# Patient Record
Sex: MALE | Race: WHITE | NOT HISPANIC OR LATINO | Employment: FULL TIME | URBAN - METROPOLITAN AREA
[De-identification: names, ages, dates, MRNs, and addresses within clinical notes are randomized per-mention and may not be internally consistent; named-entity substitution may affect disease eponyms.]

---

## 2017-01-09 ENCOUNTER — PATIENT OUTREACH (OUTPATIENT)
Dept: ADMINISTRATIVE | Facility: HOSPITAL | Age: 52
End: 2017-01-09

## 2017-01-23 ENCOUNTER — OFFICE VISIT (OUTPATIENT)
Dept: FAMILY MEDICINE | Facility: CLINIC | Age: 52
End: 2017-01-23
Payer: OTHER GOVERNMENT

## 2017-01-23 VITALS
SYSTOLIC BLOOD PRESSURE: 124 MMHG | DIASTOLIC BLOOD PRESSURE: 86 MMHG | BODY MASS INDEX: 31.67 KG/M2 | WEIGHT: 209 LBS | HEIGHT: 68 IN | RESPIRATION RATE: 18 BRPM | HEART RATE: 72 BPM

## 2017-01-23 DIAGNOSIS — F95.9 TIC: ICD-10-CM

## 2017-01-23 DIAGNOSIS — F51.01 PRIMARY INSOMNIA: ICD-10-CM

## 2017-01-23 DIAGNOSIS — F33.1 MODERATE EPISODE OF RECURRENT MAJOR DEPRESSIVE DISORDER: Primary | ICD-10-CM

## 2017-01-23 PROCEDURE — 1159F MED LIST DOCD IN RCRD: CPT | Mod: S$GLB,,, | Performed by: INTERNAL MEDICINE

## 2017-01-23 PROCEDURE — 99214 OFFICE O/P EST MOD 30 MIN: CPT | Mod: S$GLB,,, | Performed by: INTERNAL MEDICINE

## 2017-01-23 PROCEDURE — 99999 PR PBB SHADOW E&M-EST. PATIENT-LVL III: CPT | Mod: PBBFAC,,, | Performed by: INTERNAL MEDICINE

## 2017-01-23 RX ORDER — TEMAZEPAM 15 MG/1
CAPSULE ORAL
Qty: 30 CAPSULE | Refills: 0 | Status: SHIPPED | OUTPATIENT
Start: 2017-01-23 | End: 2017-08-07 | Stop reason: SDUPTHER

## 2017-01-23 NOTE — PROGRESS NOTES
Subjective:       Patient ID: Pietro Whatley is a 51 y.o. male.    Chief Complaint: Depression    HPI Comments: Depression - uncontrolled for past 1 month.  Increased sadness and tiredness.  No suicide/homicide ideation.    Insomnia - takes Ambien every night.  Seroquel/ - made him sick  Ticks - resolved for now.    Review of Systems   Constitutional: Negative for appetite change and fever.   HENT: Negative for nosebleeds and trouble swallowing.    Eyes: Negative for discharge and visual disturbance.   Respiratory: Negative for choking and shortness of breath.    Cardiovascular: Negative for chest pain and palpitations.   Gastrointestinal: Negative for abdominal pain, nausea and vomiting.   Musculoskeletal: Negative for arthralgias and joint swelling.   Skin: Negative for rash and wound.   Neurological: Negative for dizziness and syncope.   Psychiatric/Behavioral: Positive for dysphoric mood and sleep disturbance. Negative for confusion, self-injury and suicidal ideas. The patient is not nervous/anxious.        Objective:      Vitals:    01/23/17 0924   BP: 124/86   Pulse: 72   Resp: 18     Physical Exam   Constitutional: He appears well-nourished.   Eyes: Conjunctivae and EOM are normal.   Neck: Normal range of motion.   Cardiovascular: Normal rate and regular rhythm.    Pulmonary/Chest: Effort normal and breath sounds normal.   Musculoskeletal:   Normal ROM bilateral    Neurological: No cranial nerve deficit (grossly intact).   Skin: Skin is warm and dry.   Psychiatric: He has a normal mood and affect.   Alert and orientated   Vitals reviewed.        Assessment:       1. Moderate episode of recurrent major depressive disorder    2. Primary insomnia    3. Tic        Plan:       Moderate episode of recurrent major depressive disorder    Primary insomnia  -     temazepam (RESTORIL) 15 mg Cap; 1 - 2 po qhs prn insomnia  Dispense: 30 capsule; Refill: 0    Tic    DC Ambien as this may be the cause of uncontrolled  "depression  Nw, double bid Wellbutrin    Medication List with Changes/Refills   New Medications    TEMAZEPAM (RESTORIL) 15 MG CAP    1 - 2 po qhs prn insomnia   Current Medications    BUPROPION (WELLBUTRIN SR) 150 MG TBSR 12 HR TABLET    Take 150 mg by mouth 2 (two) times daily.    BUPROPION (WELLBUTRIN SR) 150 MG TBSR 12 HR TABLET    TAKE 1 TABLET (150 MG TOTAL) BY MOUTH 2 (TWO) TIMES DAILY.    CMPD TESTOSTERONE PROPRIONATE 2% IN VANICREAM    Apply 2 pea size to inner thighs daily    FLUTICASONE (FLONASE) 50 MCG/ACTUATION NASAL SPRAY    2 sprays by Each Nare route once daily.    NAPROXEN (NAPROSYN) 500 MG TABLET    Take 1 tablet (500 mg total) by mouth 2 (two) times daily.    SERTRALINE (ZOLOFT) 100 MG TABLET    Take 1 tablet (100 mg total) by mouth once daily. Takes 1.5 tablets daily.    SERTRALINE (ZOLOFT) 100 MG TABLET    TAKE 1 AND 1/2 TABLETS EVERY DAY    SIMVASTATIN (ZOCOR) 20 MG TABLET    Take 1 tablet (20 mg total) by mouth every evening.   Discontinued Medications    ZOLPIDEM (AMBIEN) 10 MG TAB    TAKE 1 TABLET (10 MG TOTAL) BY MOUTH NIGHTLY AS NEEDED.             Counseled on regular exercise, maintenance of a healthy weight, balanced diet rich in fruits/vegetables and lean protein, and avoidance of unhealthy habits like smoking and excessive alcohol intake.   Also, counseled on importance of being compliant with medication, health appointments, diet and exercise.     Return in about 2 weeks (around 2/6/2017). annual and mood and sleep    "This note will not be shared with the patient."  "

## 2017-01-23 NOTE — MR AVS SNAPSHOT
[facility]  [address]  [place] [address]  Phone: [phone]  Fax: [phone]                  Pietro Whatley   2017 9:50 AM   Office Visit    Description:  Male : 1965   Provider:  Jabier Carcamo MD   Department:  [facility]           Reason for Visit     Hyperlipidemia           Diagnoses this Visit        Comments    Moderate episode of recurrent major depressive disorder    -  Primary     Primary insomnia                To Do List           Future Appointments        Provider Department Dept Phone    2017 10:10 AM Jabier Carcamo MD [facility] [phone]      Goals (5 Years of Data)     None      Follow-Up and Disposition     Return in about 2 weeks (around 2017).      [facility] On Call     [facility] On Call Nurse Care Line -  Assistance  Registered nurses in the [facility] provide clinical advisement, health education, appointment booking, and other advisory services.  Call for this free service at [phone].             Medications           Message regarding Medications     Verify the changes and/or additions to your medication regime listed below are the same as discussed with your clinician today.  If any of these changes or additions are incorrect, please notify your healthcare provider.             Verify that the below list of medications is an accurate representation of the medications you are currently taking.  If none reported, the list may be blank. If incorrect, please contact your healthcare provider. Carry this list with you in case of emergency.           Current Medications     buPROPion (WELLBUTRIN SR) 150 MG TBSR 12 hr tablet Take 150 mg by mouth 2 (two) times daily.    buPROPion (WELLBUTRIN SR) 150 MG TBSR 12 hr tablet TAKE 1 TABLET (150 MG TOTAL) BY MOUTH 2 (TWO) TIMES DAILY.    CMPD testosterone proprionate 2% in vanicream Apply 2 pea size to inner thighs daily    fluticasone  "(FLONASE) 50 mcg/actuation nasal spray 2 sprays by Each Nare route once daily.    naproxen (NAPROSYN) 500 MG tablet Take 1 tablet (500 mg total) by mouth 2 (two) times daily.    sertraline (ZOLOFT) 100 MG tablet Take 1 tablet (100 mg total) by mouth once daily. Takes 1.5 tablets daily.    sertraline (ZOLOFT) 100 MG tablet TAKE 1 AND 1/2 TABLETS EVERY DAY    simvastatin (ZOCOR) 20 MG tablet Take 1 tablet (20 mg total) by mouth every evening.    zolpidem (AMBIEN) 10 mg Tab TAKE 1 TABLET (10 MG TOTAL) BY MOUTH NIGHTLY AS NEEDED.           Clinical Reference Information           Vital Signs - Last Recorded  Most recent update: 1/23/2017  9:26 AM by Jeannette Flores LPN    BP Pulse Resp Ht Wt BMI    124/86 (BP Location: Right arm, Patient Position: Sitting, BP Method: Manual) 72 18 5' 8" (1.727 m) 94.8 kg (208 lb 15.9 oz) 31.78 kg/m2      Blood Pressure          Most Recent Value    BP  124/86      Allergies as of 1/23/2017     No Known Allergies      Immunizations Administered on Date of Encounter - 1/23/2017     None      Instructions    Use Melatonin to help you sleep.  Start with 5 mg 1 hr before bed and 5 mg as you turn off the light.  You may increase the 1 hr before dose to 10 mg, 20 mg, or 50 mg.  Keep the 5 mg as you turn off the light.         "

## 2017-01-24 DIAGNOSIS — R79.89 LOW TESTOSTERONE: ICD-10-CM

## 2017-02-06 ENCOUNTER — OFFICE VISIT (OUTPATIENT)
Dept: FAMILY MEDICINE | Facility: CLINIC | Age: 52
End: 2017-02-06
Payer: OTHER GOVERNMENT

## 2017-02-06 VITALS
SYSTOLIC BLOOD PRESSURE: 124 MMHG | DIASTOLIC BLOOD PRESSURE: 92 MMHG | BODY MASS INDEX: 31.74 KG/M2 | WEIGHT: 209.44 LBS | HEIGHT: 68 IN | HEART RATE: 80 BPM

## 2017-02-06 DIAGNOSIS — R79.89 LOW TESTOSTERONE: ICD-10-CM

## 2017-02-06 DIAGNOSIS — Z00.00 ROUTINE PHYSICAL EXAMINATION: Primary | ICD-10-CM

## 2017-02-06 DIAGNOSIS — E78.5 DYSLIPIDEMIA: ICD-10-CM

## 2017-02-06 DIAGNOSIS — G47.00 INSOMNIA, UNSPECIFIED TYPE: ICD-10-CM

## 2017-02-06 DIAGNOSIS — F32.A DEPRESSION, UNSPECIFIED DEPRESSION TYPE: ICD-10-CM

## 2017-02-06 PROCEDURE — 99396 PREV VISIT EST AGE 40-64: CPT | Mod: S$GLB,,, | Performed by: INTERNAL MEDICINE

## 2017-02-06 PROCEDURE — 99999 PR PBB SHADOW E&M-EST. PATIENT-LVL III: CPT | Mod: PBBFAC,,, | Performed by: INTERNAL MEDICINE

## 2017-02-06 NOTE — PROGRESS NOTES
Subjective:       Patient ID: Pietro Whatley is a 51 y.o. male.    Chief Complaint: Annual Exam    HPI Comments: Here for routine health maintenance.  No new complaints.  Depression controlled off ambien  Insomnia controlled w 30mg melatonin  HLD - borderline controlled  Low testosterone - uncontrolled; using jar cream. New rx for testosterone crm 10%, 1g bid inner thigh, qs sent to [facility].      Labs done at [facility].    Review of Systems   Constitutional: Negative for activity change, appetite change, fever and unexpected weight change.   HENT: Negative for hearing loss, nosebleeds, rhinorrhea and trouble swallowing.    Eyes: Negative for discharge and visual disturbance.   Respiratory: Negative for choking, chest tightness, shortness of breath and wheezing.    Cardiovascular: Negative for chest pain and palpitations.   Gastrointestinal: Negative for abdominal pain, blood in stool, constipation, diarrhea, nausea and vomiting.   Endocrine: Negative for polydipsia and polyuria.   Genitourinary: Negative for difficulty urinating, hematuria and urgency.   Musculoskeletal: Negative for arthralgias and joint swelling.   Skin: Negative for rash and wound.   Neurological: Positive for headaches. Negative for dizziness, syncope and weakness.   Psychiatric/Behavioral: Positive for dysphoric mood. Negative for confusion.       Objective:      Vitals:    02/06/17 0958   BP: (!) 124/92   Pulse: 80     Physical Exam   Constitutional: He appears well-nourished.   Eyes: Conjunctivae and EOM are normal.   Neck: Trachea normal and normal range of motion. No thyromegaly present.   Cardiovascular: Normal heart sounds.    Edema negative   Pulmonary/Chest: Effort normal and breath sounds normal.   Abdominal: Soft. There is no hepatomegaly.   Musculoskeletal:   ROM normal bilateral  Strength normal bilateral   Neurological: He has normal reflexes. No cranial nerve deficit.   Skin: Skin is warm, dry and intact.   Psychiatric: He has a  "normal mood and affect.   Alert and Oriented    Vitals reviewed.        Assessment:       1. Routine physical examination    2. Dyslipidemia    3. Depression, unspecified depression type    4. Insomnia, unspecified type    5. Low testosterone        Plan:       Routine physical examination    Dyslipidemia    Depression, unspecified depression type    Insomnia, unspecified type    Low testosterone    inc test  Continue current plan of care  Wellness reviwed        Counseled on regular exercise, maintenance of a healthy weight, balanced diet rich in fruits/vegetables and lean protein, and avoidance of unhealthy habits like smoking and excessive alcohol intake.   Also, counseled on importance of being compliant with medication, health appointments, diet and exercise.     Return in about 6 months (around 8/6/2017).    "This note will not be shared with the patient."  "

## 2017-02-06 NOTE — MR AVS SNAPSHOT
[facility]  [address]  [place] [address]  Phone: [phone]  Fax: [phone]                  Pietro Whatley   2017 10:10 AM   Office Visit    Description:  Male : 1965   Provider:  Jabier Carcamo MD   Department:  [facility]           Reason for Visit     Annual Exam           Diagnoses this Visit        Comments    Routine physical examination    -  Primary     Dyslipidemia         Depression, unspecified depression type         Insomnia, unspecified type         Low testosterone                To Do List           Future Appointments        Provider Department Dept Phone    2017 9:50 AM Jabier Carcamo MD [facility] [phone]      Goals (5 Years of Data)     None      Follow-Up and Disposition     Return in about 6 months (around 2017).      [facility] On Call     [facility] On Call Nurse Care Line -  Assistance  Registered nurses in the [facility] provide clinical advisement, health education, appointment booking, and other advisory services.  Call for this free service at [phone].             Medications           Message regarding Medications     Verify the changes and/or additions to your medication regime listed below are the same as discussed with your clinician today.  If any of these changes or additions are incorrect, please notify your healthcare provider.             Verify that the below list of medications is an accurate representation of the medications you are currently taking.  If none reported, the list may be blank. If incorrect, please contact your healthcare provider. Carry this list with you in case of emergency.           Current Medications     buPROPion (WELLBUTRIN SR) 150 MG TBSR 12 hr tablet TAKE 1 TABLET (150 MG TOTAL) BY MOUTH 2 (TWO) TIMES DAILY.    CMPD testosterone proprionate 2% in vanicream Apply 2 pea size to inner thighs daily    sertraline (ZOLOFT) 100 MG tablet Take 1  "tablet (100 mg total) by mouth once daily. Takes 1.5 tablets daily.    simvastatin (ZOCOR) 20 MG tablet Take 1 tablet (20 mg total) by mouth every evening.    fluticasone (FLONASE) 50 mcg/actuation nasal spray 2 sprays by Each Nare route once daily.    naproxen (NAPROSYN) 500 MG tablet Take 1 tablet (500 mg total) by mouth 2 (two) times daily.    temazepam (RESTORIL) 15 mg Cap 1 - 2 po qhs prn insomnia           Clinical Reference Information           Your Vitals Were     BP Pulse Height Weight BMI    124/92 80 5' 8" (1.727 m) 95 kg (209 lb 7 oz) 31.84 kg/m2      Blood Pressure          Most Recent Value    BP  (!)  124/92      Allergies as of 2/6/2017     No Known Allergies      Immunizations Administered on Date of Encounter - 2/6/2017     None      Language Assistance Services     ATTENTION: Language assistance services are available, free of charge. Please call [phone].      ATENCIÓN: Si habla haileyañol, tiene a carrasquillo disposición servicios gratuitos de asistencia lingüística. Llame al [phone].     ALIRIO Ý: N?u b?n nói Ti?ng Vi?t, có các d?ch v? h? tr? ngôn ng? mi?n phí aaronh cho b?n. G?i s? [phone].         [facility] complies with applicable Federal civil rights laws and does not discriminate on the basis of race, color, national origin, age, disability, or sex.        "

## 2017-03-20 ENCOUNTER — TELEPHONE (OUTPATIENT)
Dept: FAMILY MEDICINE | Facility: CLINIC | Age: 52
End: 2017-03-20

## 2017-03-23 DIAGNOSIS — F32.A DEPRESSION: ICD-10-CM

## 2017-03-23 RX ORDER — BUPROPION HYDROCHLORIDE 150 MG/1
TABLET, EXTENDED RELEASE ORAL
Qty: 90 TABLET | Refills: 1 | Status: SHIPPED | OUTPATIENT
Start: 2017-03-23 | End: 2017-07-11 | Stop reason: SDUPTHER

## 2017-04-05 DIAGNOSIS — F32.A DEPRESSION: ICD-10-CM

## 2017-04-05 RX ORDER — SERTRALINE HYDROCHLORIDE 100 MG/1
TABLET, FILM COATED ORAL
Qty: 90 TABLET | Refills: 3 | Status: SHIPPED | OUTPATIENT
Start: 2017-04-05 | End: 2017-08-07 | Stop reason: SDUPTHER

## 2017-04-27 DIAGNOSIS — E78.00 HYPERCHOLESTEREMIA: ICD-10-CM

## 2017-04-27 RX ORDER — SIMVASTATIN 20 MG/1
TABLET, FILM COATED ORAL
Qty: 90 TABLET | Refills: 1 | Status: SHIPPED | OUTPATIENT
Start: 2017-04-27 | End: 2017-08-07 | Stop reason: SDUPTHER

## 2017-07-11 DIAGNOSIS — F32.A DEPRESSION: ICD-10-CM

## 2017-07-11 RX ORDER — BUPROPION HYDROCHLORIDE 150 MG/1
TABLET, EXTENDED RELEASE ORAL
Qty: 90 TABLET | Refills: 1 | Status: SHIPPED | OUTPATIENT
Start: 2017-07-11 | End: 2017-08-07 | Stop reason: SDUPTHER

## 2017-07-20 ENCOUNTER — TELEPHONE (OUTPATIENT)
Dept: FAMILY MEDICINE | Facility: CLINIC | Age: 52
End: 2017-07-20

## 2017-07-20 DIAGNOSIS — R53.83 FATIGUE, UNSPECIFIED TYPE: Primary | ICD-10-CM

## 2017-07-20 NOTE — TELEPHONE ENCOUNTER
----- Message from Kenyetta Joaquin sent at 7/20/2017 11:34 AM CDT -----  Contact: Jyoti Whatley  Patient spouse is requesting order for labs to be sent to [facility] , phone number [phone], contact Jyoti at [phone] with any questions.    Thank you

## 2017-07-20 NOTE — TELEPHONE ENCOUNTER
Pt requesting to have labs sent to [facility] was able to transfer all the other orders but the testosterone. Have pended the order to be signed. Dr Carcamo is out of the office today. Please review and advise. Thank you. CLC

## 2017-07-28 LAB — TESTOST SERPL-MCNC: 251 NG/DL (ref 250–827)

## 2017-08-07 ENCOUNTER — OFFICE VISIT (OUTPATIENT)
Dept: FAMILY MEDICINE | Facility: CLINIC | Age: 52
End: 2017-08-07
Payer: OTHER GOVERNMENT

## 2017-08-07 VITALS
RESPIRATION RATE: 18 BRPM | WEIGHT: 210.13 LBS | BODY MASS INDEX: 31.85 KG/M2 | HEIGHT: 68 IN | OXYGEN SATURATION: 97 % | SYSTOLIC BLOOD PRESSURE: 126 MMHG | HEART RATE: 68 BPM | DIASTOLIC BLOOD PRESSURE: 88 MMHG

## 2017-08-07 DIAGNOSIS — R79.89 LOW TESTOSTERONE: ICD-10-CM

## 2017-08-07 DIAGNOSIS — F32.A DEPRESSION, UNSPECIFIED DEPRESSION TYPE: ICD-10-CM

## 2017-08-07 DIAGNOSIS — F51.01 PRIMARY INSOMNIA: ICD-10-CM

## 2017-08-07 DIAGNOSIS — E78.00 HYPERCHOLESTEREMIA: ICD-10-CM

## 2017-08-07 PROCEDURE — 99999 PR PBB SHADOW E&M-EST. PATIENT-LVL III: CPT | Mod: PBBFAC,,, | Performed by: INTERNAL MEDICINE

## 2017-08-07 PROCEDURE — 3008F BODY MASS INDEX DOCD: CPT | Mod: S$GLB,,, | Performed by: INTERNAL MEDICINE

## 2017-08-07 PROCEDURE — 99214 OFFICE O/P EST MOD 30 MIN: CPT | Mod: S$GLB,,, | Performed by: INTERNAL MEDICINE

## 2017-08-07 RX ORDER — SIMVASTATIN 20 MG/1
TABLET, FILM COATED ORAL
Qty: 90 TABLET | Refills: 1 | Status: SHIPPED | OUTPATIENT
Start: 2017-08-07 | End: 2018-02-07 | Stop reason: SDUPTHER

## 2017-08-07 RX ORDER — BUPROPION HYDROCHLORIDE 150 MG/1
TABLET, EXTENDED RELEASE ORAL
Qty: 90 TABLET | Refills: 1 | Status: SHIPPED | OUTPATIENT
Start: 2017-08-07 | End: 2018-02-07 | Stop reason: SDUPTHER

## 2017-08-07 RX ORDER — TEMAZEPAM 15 MG/1
CAPSULE ORAL
Qty: 30 CAPSULE | Refills: 2 | Status: SHIPPED | OUTPATIENT
Start: 2017-08-07 | End: 2018-01-19 | Stop reason: SDUPTHER

## 2017-08-07 NOTE — PROGRESS NOTES
"Subjective:       Patient ID: Pietro Whatley is a 52 y.o. male.    Chief Complaint: Low Testosterone (f/u); Hyperlipidemia (f/u ); and Medication Refill    Depression controlled off ambien  Insomnia controlled w 30mg melatonin  HLD - borderline controlled  Low testosterone - uncontrolled; using jar cream. New inc rx for testosterone crm 10%, 2g bid inner thigh, qs sent to [facility].  Pt does not want shots.    Labs done at [email or web address]. - order "total testosterone"      Review of Systems   Constitutional: Negative for appetite change and fever.   HENT: Negative for nosebleeds and trouble swallowing.    Eyes: Negative for discharge and visual disturbance.   Respiratory: Negative for choking and shortness of breath.    Cardiovascular: Negative for chest pain and palpitations.   Gastrointestinal: Negative for abdominal pain, nausea and vomiting.   Musculoskeletal: Negative for arthralgias and joint swelling.   Skin: Negative for rash and wound.   Neurological: Negative for dizziness and syncope.   Psychiatric/Behavioral: Negative for confusion and dysphoric mood.       Objective:      Vitals:    08/07/17 0953   BP: 126/88   Pulse: 68   Resp: 18     Physical Exam   Constitutional: He appears well-nourished.   Eyes: Conjunctivae and EOM are normal.   Neck: Normal range of motion.   Cardiovascular: Normal rate and regular rhythm.    Pulmonary/Chest: Effort normal and breath sounds normal.   Musculoskeletal:   Normal ROM bilateral    Neurological: No cranial nerve deficit (grossly intact).   Skin: Skin is warm and dry.   Psychiatric: He has a normal mood and affect.   Alert and orientated   Vitals reviewed.        Assessment:       1. Hypercholesteremia    2. Depression, unspecified depression type    3. Low testosterone    4. Primary insomnia        Plan:       Hypercholesteremia  -     simvastatin (ZOCOR) 20 MG tablet; TAKE 1 TABLET (20 MG TOTAL) BY MOUTH EVERY EVENING.  Dispense: 90 tablet; Refill: 1  -     Cancel: Lipid panel; " "Future; Expected date: 02/03/2018  -     Cancel: Comprehensive metabolic panel; Future; Expected date: 02/03/2018  -     Lipid panel; Future; Expected date: 02/03/2018  -     Comprehensive metabolic panel; Future; Expected date: 02/03/2018    Depression, unspecified depression type  -     buPROPion (WELLBUTRIN SR) 150 MG TBSR 12 hr tablet; TAKE 1 TABLET (150 MG TOTAL) BY MOUTH 2 (TWO) TIMES DAILY.  Dispense: 90 tablet; Refill: 1    Low testosterone  -     CMPD testosterone proprionate 2% in vanicream; Apply 4 pea size to inner thighs daily  Dispense: 60 g; Refill: 5  -     Cancel: Testosterone; Future; Expected date: 02/03/2018  -     Testosterone, Total, Males; Future; Expected date: 02/03/2018    Primary insomnia  -     temazepam (RESTORIL) 15 mg Cap; 1 - 2 po qhs prn insomnia  Dispense: 30 capsule; Refill: 2    inc testosterone         Counseled on regular exercise, maintenance of a healthy weight, balanced diet rich in fruits/vegetables and lean protein, and avoidance of unhealthy habits like smoking and excessive alcohol intake.   Also, counseled on importance of being compliant with medication, health appointments, diet and exercise.     Return in about 6 months (around 2/7/2018).    "This note will not be shared with the patient."  "

## 2017-10-08 DIAGNOSIS — E78.00 HYPERCHOLESTEREMIA: ICD-10-CM

## 2017-10-09 NOTE — PROGRESS NOTES
Refill Authorization Note     is requesting a refill authorization.    Brief assessment and rational for refill: DENY; recently sent in  Name of medication: SIMVASTATIN 20 MG TABLET  How patient will take medication: t1t po daily   Amount/Quantity of medication ordered: 90d   Medication reconciliation completed: No        Refills Authorized: No     If refills not authorized provide recommendations: Recently approved      Medication Therapy Plan: Approved for 6 mo supply on 8/17.  Will DEny  Comments:   Lab Results   Component Value Date    CHOL 204 (H) 07/27/2017    CHOL 200 (H) 10/16/2015     Lab Results   Component Value Date    HDL 64 07/27/2017    HDL 62 10/16/2015     Lab Results   Component Value Date    LDLCALC 122 07/27/2017    LDLCALC 105.4 10/16/2015     Lab Results   Component Value Date    TRIG 89 07/27/2017    TRIG 163 (H) 10/16/2015     Lab Results   Component Value Date    CHOLHDL 3.2 07/27/2017    CHOLHDL 31.0 10/16/2015

## 2017-10-12 RX ORDER — SIMVASTATIN 20 MG/1
TABLET, FILM COATED ORAL
Qty: 90 TABLET | Refills: 1 | Status: SHIPPED | OUTPATIENT
Start: 2017-10-12

## 2018-01-19 DIAGNOSIS — F51.01 PRIMARY INSOMNIA: ICD-10-CM

## 2018-01-22 ENCOUNTER — OFFICE VISIT (OUTPATIENT)
Dept: ALLERGY | Facility: CLINIC | Age: 53
End: 2018-01-22
Payer: OTHER GOVERNMENT

## 2018-01-22 ENCOUNTER — TELEPHONE (OUTPATIENT)
Dept: FAMILY MEDICINE | Facility: CLINIC | Age: 53
End: 2018-01-22

## 2018-01-22 VITALS — WEIGHT: 211.88 LBS | HEART RATE: 76 BPM | OXYGEN SATURATION: 96 % | HEIGHT: 68 IN | BODY MASS INDEX: 32.11 KG/M2

## 2018-01-22 DIAGNOSIS — L29.9 PRURITUS: ICD-10-CM

## 2018-01-22 DIAGNOSIS — L50.8 ACUTE URTICARIA: Primary | ICD-10-CM

## 2018-01-22 DIAGNOSIS — J31.0 OTHER CHRONIC RHINITIS: ICD-10-CM

## 2018-01-22 PROCEDURE — 99999 PR PBB SHADOW E&M-EST. PATIENT-LVL III: CPT | Mod: PBBFAC,,, | Performed by: ALLERGY & IMMUNOLOGY

## 2018-01-22 PROCEDURE — 99204 OFFICE O/P NEW MOD 45 MIN: CPT | Mod: S$GLB,,, | Performed by: ALLERGY & IMMUNOLOGY

## 2018-01-22 RX ORDER — DIPHENHYDRAMINE HCL 50 MG
50 CAPSULE ORAL EVERY 6 HOURS PRN
COMMUNITY

## 2018-01-22 NOTE — TELEPHONE ENCOUNTER
----- Message from Jacinda Mattson sent at 1/22/2018 10:10 AM CST -----  [phone]    Morning,    Mr Whatley stopped  to see about his refills, he said usually they are filled automatically but he is out of a few meds, pls give him a call at [phone] to discuss.    Thank You!

## 2018-01-22 NOTE — TELEPHONE ENCOUNTER
Spoke with patient here at the clinic this morning regarding refill. I advised patient that refill request was pend to provider 01/19/18 and we would like to give provider 72 hours to get rx sent to pharmacy. Patient verbalized understanding.

## 2018-01-22 NOTE — PROGRESS NOTES
Subjective:       Patient ID: Pietro Whatley is a 52 y.o. male.    Chief Complaint:  Allergic Reaction (HIves, itching on hands/feet, sudden oncome. takes benadryl )      51 yo man presents for new patent evaluation of possible allergic reactions. He states he hash ad 2 reactions. Both were as going to sleep at night. First time he had sudden onset of extremely intense itching of hands and feet, they were red with small bumps and itch so bad he could not stand it. He tool  mg benadryl. It lasted 75-90 minutes then stopped but after he felt extremely exhausted and nauseated, no vomiting. He had no where else other then hands and feet. Second episode was last Sunday (1/14). Again at night about to go to sleep. Sudden onset intense itch. however this time about 45 minutes in itch spread all over body. Palms and soles red and small bumps but rest of skin just itchy. Again benadryl  mg int he time and lasted about 90 minutes. Again after he was exhausted and nauseated but no other symptoms. No changes in diet or environment. No triggers. He has lived in [place] for 3 years and about to move to [facility]. He has congestion daily while living here so takes benadryl most every night. He has no asthma or eczema. No known food, insect or latex allergy. No other major medial issues and never had sinus or ENT surgery.         Environmental History: see history section for home environment  Review of Systems   Constitutional: Negative for activity change, appetite change, chills, fatigue, fever and unexpected weight change.   HENT: Positive for congestion. Negative for ear discharge, ear pain, facial swelling, hearing loss, mouth sores, nosebleeds, postnasal drip, rhinorrhea, sinus pressure, sneezing, sore throat, tinnitus, trouble swallowing and voice change.    Eyes: Negative for discharge, redness, itching and visual disturbance.   Respiratory: Negative for cough, chest tightness, shortness of breath and wheezing.     Cardiovascular: Negative for chest pain, palpitations and leg swelling.   Gastrointestinal: Negative for abdominal distention, abdominal pain, constipation, diarrhea, nausea and vomiting.   Genitourinary: Negative for difficulty urinating.   Musculoskeletal: Negative for arthralgias, back pain, joint swelling and myalgias.   Skin: Positive for color change and rash. Negative for pallor.   Neurological: Negative for dizziness, tremors, speech difficulty, weakness, light-headedness and headaches.   Hematological: Negative for adenopathy. Does not bruise/bleed easily.   Psychiatric/Behavioral: Negative for agitation, confusion, decreased concentration and sleep disturbance. The patient is not nervous/anxious.         Objective:    Physical Exam   Constitutional: He is oriented to person, place, and time. He appears well-developed and well-nourished. No distress.   HENT:   Head: Normocephalic and atraumatic.   Right Ear: Hearing, tympanic membrane, external ear and ear canal normal.   Left Ear: Hearing, tympanic membrane, external ear and ear canal normal.   Nose: No mucosal edema (pink turbinates), rhinorrhea, sinus tenderness or septal deviation. No epistaxis.   Mouth/Throat: Oropharynx is clear and moist and mucous membranes are normal. No uvula swelling.   Eyes: Conjunctivae are normal. Right eye exhibits no discharge. Left eye exhibits no discharge.   Neck: Normal range of motion. No thyromegaly present.   Cardiovascular: Normal rate, regular rhythm and normal heart sounds.    No murmur heard.  Pulmonary/Chest: Effort normal and breath sounds normal. No respiratory distress. He has no wheezes.   Abdominal: Soft. He exhibits no distension. There is no tenderness.   Musculoskeletal: Normal range of motion. He exhibits no edema.   Lymphadenopathy:     He has no cervical adenopathy.   Neurological: He is alert and oriented to person, place, and time. Coordination normal.   Skin: Skin is warm and dry. No rash noted.  No erythema.   Psychiatric: He has a normal mood and affect. His behavior is normal. Judgment and thought content normal.   Nursing note and vitals reviewed.      Laboratory:   none performed   Assessment:       1. Acute urticaria    2. Pruritus    3. Other chronic rhinitis         Plan:       1. advise pt could be allergy vs systemic trigger vs idiopathic (likely viral). Will send labs to further evaluate  2. Advised to get liquid benadryl and take 50 mg as needed if flare again  3. Phone review

## 2018-01-25 NOTE — TELEPHONE ENCOUNTER
----- Message from Chelsie Flores sent at 1/25/2018  2:55 PM CST -----  Call Jyoti Whatley / [phone] .. States she requested 10 days ago for refill temazepam / pharmacy sent 3 request .. Asking to know when this will be done ?   [id number] [place] - [address] [facility]  [address] [place] [place] [address]  Phone: [phone] Fax: [phone]

## 2018-01-26 RX ORDER — TEMAZEPAM 15 MG/1
CAPSULE ORAL
Qty: 30 CAPSULE | Refills: 0 | Status: SHIPPED | OUTPATIENT
Start: 2018-01-26 | End: 2018-02-07 | Stop reason: SDUPTHER

## 2018-01-31 ENCOUNTER — TELEPHONE (OUTPATIENT)
Dept: ALLERGY | Facility: CLINIC | Age: 53
End: 2018-01-31

## 2018-01-31 NOTE — TELEPHONE ENCOUNTER
Attempted to call [facility] back, no answer.      ----- Message from Haily Bernal sent at 1/31/2018  8:45 AM CST -----  Contact: Alexis  Place call to Alexis cardenas with [facility] called to verify orders.please call back at [phone]. Thanks,

## 2018-02-01 LAB
ALBUMIN SERPL-MCNC: 4.4 G/DL (ref 3.6–5.1)
ALBUMIN/GLOB SERPL: 1.8 (CALC) (ref 1–2.5)
ALP SERPL-CCNC: 67 U/L (ref 40–115)
ALT SERPL-CCNC: 26 U/L (ref 9–46)
AST SERPL-CCNC: 23 U/L (ref 10–35)
BILIRUB SERPL-MCNC: 0.4 MG/DL (ref 0.2–1.2)
BUN SERPL-MCNC: 19 MG/DL (ref 7–25)
BUN/CREAT SERPL: NORMAL (CALC) (ref 6–22)
CALCIUM SERPL-MCNC: 9.9 MG/DL (ref 8.6–10.3)
CHLORIDE SERPL-SCNC: 104 MMOL/L (ref 98–110)
CHOLEST SERPL-MCNC: 281 MG/DL
CHOLEST/HDLC SERPL: 4.6 (CALC)
CO2 SERPL-SCNC: 31 MMOL/L (ref 20–31)
CREAT SERPL-MCNC: 1.17 MG/DL (ref 0.7–1.33)
GFR SERPL CREATININE-BSD FRML MDRD: 70 ML/MIN/1.73M2
GLOBULIN SER CALC-MCNC: 2.4 G/DL (CALC) (ref 1.9–3.7)
GLUCOSE SERPL-MCNC: 95 MG/DL (ref 65–99)
HDLC SERPL-MCNC: 61 MG/DL
LDLC SERPL CALC-MCNC: 189 MG/DL (CALC)
NONHDLC SERPL-MCNC: 220 MG/DL (CALC)
POTASSIUM SERPL-SCNC: 4.9 MMOL/L (ref 3.5–5.3)
PROT SERPL-MCNC: 6.8 G/DL (ref 6.1–8.1)
SODIUM SERPL-SCNC: 142 MMOL/L (ref 135–146)
TESTOST SERPL-MCNC: 249 NG/DL (ref 250–827)
TRIGL SERPL-MCNC: 151 MG/DL

## 2018-02-07 ENCOUNTER — OFFICE VISIT (OUTPATIENT)
Dept: FAMILY MEDICINE | Facility: CLINIC | Age: 53
End: 2018-02-07
Payer: OTHER GOVERNMENT

## 2018-02-07 VITALS
RESPIRATION RATE: 15 BRPM | BODY MASS INDEX: 32.18 KG/M2 | OXYGEN SATURATION: 95 % | SYSTOLIC BLOOD PRESSURE: 128 MMHG | DIASTOLIC BLOOD PRESSURE: 82 MMHG | HEIGHT: 68 IN | HEART RATE: 78 BPM | WEIGHT: 212.31 LBS

## 2018-02-07 DIAGNOSIS — R79.89 LOW TESTOSTERONE: ICD-10-CM

## 2018-02-07 DIAGNOSIS — Z00.00 ROUTINE PHYSICAL EXAMINATION: Primary | ICD-10-CM

## 2018-02-07 DIAGNOSIS — E78.5 DYSLIPIDEMIA: ICD-10-CM

## 2018-02-07 DIAGNOSIS — G47.00 INSOMNIA, UNSPECIFIED TYPE: ICD-10-CM

## 2018-02-07 DIAGNOSIS — F32.A DEPRESSION, UNSPECIFIED DEPRESSION TYPE: ICD-10-CM

## 2018-02-07 PROCEDURE — 99999 PR PBB SHADOW E&M-EST. PATIENT-LVL III: CPT | Mod: PBBFAC,,, | Performed by: INTERNAL MEDICINE

## 2018-02-07 PROCEDURE — 99396 PREV VISIT EST AGE 40-64: CPT | Mod: S$GLB,,, | Performed by: INTERNAL MEDICINE

## 2018-02-07 RX ORDER — SERTRALINE HYDROCHLORIDE 100 MG/1
100 TABLET, FILM COATED ORAL DAILY
Qty: 90 TABLET | Refills: 3 | Status: SHIPPED | OUTPATIENT
Start: 2018-02-07 | End: 2018-02-27 | Stop reason: SDUPTHER

## 2018-02-07 RX ORDER — BUPROPION HYDROCHLORIDE 150 MG/1
TABLET, EXTENDED RELEASE ORAL
Qty: 90 TABLET | Refills: 1 | Status: SHIPPED | OUTPATIENT
Start: 2018-02-07 | End: 2018-07-24 | Stop reason: SDUPTHER

## 2018-02-07 RX ORDER — SIMVASTATIN 20 MG/1
TABLET, FILM COATED ORAL
Qty: 90 TABLET | Refills: 1 | Status: SHIPPED | OUTPATIENT
Start: 2018-02-07

## 2018-02-07 RX ORDER — TEMAZEPAM 15 MG/1
CAPSULE ORAL
Qty: 100 CAPSULE | Refills: 1 | Status: SHIPPED | OUTPATIENT
Start: 2018-02-07

## 2018-02-07 RX ORDER — TESTOSTERONE CYPIONATE 200 MG/ML
200 INJECTION, SOLUTION INTRAMUSCULAR
Qty: 10 ML | Refills: 1 | Status: SHIPPED | OUTPATIENT
Start: 2018-02-07 | End: 2018-08-08

## 2018-02-07 NOTE — PROGRESS NOTES
Subjective:       Patient ID: Pietro Whatley is a 52 y.o. male.    Chief Complaint: Annual Exam    Here for routine health maintenance.  No new complaints.  Patient is moving to [facility]  Depression controlled off ambien on Zoloft and Wellbutrin  Insomnia controlled w 15 - 30 mg of Restoril  HLD - uncontrolled but off simvastatin for 2 weeks.  Previously controlled LDL goal < 130/160 (age)  Low testosterone - uncontrolled; using jar cream. New inc rx for testosterone compound crm 10%, 2g bid inner thigh, qs sent to [facility].  Open to shots.      Review of Systems   Constitutional: Negative for appetite change and fever.   HENT: Negative for nosebleeds and trouble swallowing.    Eyes: Negative for discharge and visual disturbance.   Respiratory: Negative for choking and shortness of breath.    Cardiovascular: Negative for chest pain and palpitations.   Gastrointestinal: Negative for abdominal pain, nausea and vomiting.   Musculoskeletal: Negative for arthralgias and joint swelling.   Skin: Negative for rash and wound.   Neurological: Negative for dizziness and syncope.   Psychiatric/Behavioral: Negative for confusion and dysphoric mood.       Objective:      Vitals:    02/07/18 0948   BP: 128/82   Pulse: 78   Resp: 15     Physical Exam   Constitutional: He appears well-nourished.   Eyes: Conjunctivae and EOM are normal.   Neck: Trachea normal and normal range of motion. No thyromegaly present.   Cardiovascular: Normal heart sounds.    Edema negative   Pulmonary/Chest: Effort normal and breath sounds normal.   Abdominal: Soft. There is no hepatomegaly.   Musculoskeletal:   ROM normal bilateral  Strength normal bilateral   Neurological: He has normal reflexes. No cranial nerve deficit.   Skin: Skin is warm, dry and intact.   Psychiatric: He has a normal mood and affect.   Alert and Oriented    Vitals reviewed.        Assessment:       1. Routine physical examination    2. Dyslipidemia    3. Low testosterone    4.  Insomnia, unspecified type    5. Depression, unspecified depression type        Plan:       Routine physical examination    Dyslipidemia  -     simvastatin (ZOCOR) 20 MG tablet; TAKE 1 TABLET (20 MG TOTAL) BY MOUTH EVERY EVENING.  Dispense: 90 tablet; Refill: 1    Low testosterone  -     testosterone cypionate (DEPO-TESTOSTERONE) 200 mg/mL injection; Inject 1 mL (200 mg total) into the muscle every 14 (fourteen) days. Give with appropriate size needles with quantity sufficient amount.  Dispense: 10 mL; Refill: 1    Insomnia, unspecified type  -     temazepam (RESTORIL) 15 mg Cap; TAKE 1 TO 2 CAPSULES BY MOUTH AT BEDTIME AS NEEDED INSOMNIA  Dispense: 100 capsule; Refill: 1    Depression, unspecified depression type  -     buPROPion (WELLBUTRIN SR) 150 MG TBSR 12 hr tablet; TAKE 1 TABLET (150 MG TOTAL) BY MOUTH 2 (TWO) TIMES DAILY.  Dispense: 90 tablet; Refill: 1  -     sertraline (ZOLOFT) 100 MG tablet; Take 1 tablet (100 mg total) by mouth once daily. Takes 1.5 tablets daily.  Dispense: 90 tablet; Refill: 3      Medication List with Changes/Refills   New Medications    TESTOSTERONE CYPIONATE (DEPO-TESTOSTERONE) 200 MG/ML INJECTION    Inject 1 mL (200 mg total) into the muscle every 14 (fourteen) days. Give with appropriate size needles with quantity sufficient amount.   Current Medications    DIPHENHYDRAMINE (BENADRYL) 50 MG CAPSULE    Take 50 mg by mouth every 6 (six) hours as needed for Itching.    SIMVASTATIN (ZOCOR) 20 MG TABLET    TAKE 1 TABLET (20 MG TOTAL) BY MOUTH EVERY EVENING.   Changed and/or Refilled Medications    Modified Medication Previous Medication    BUPROPION (WELLBUTRIN SR) 150 MG TBSR 12 HR TABLET buPROPion (WELLBUTRIN SR) 150 MG TBSR 12 hr tablet       TAKE 1 TABLET (150 MG TOTAL) BY MOUTH 2 (TWO) TIMES DAILY.    TAKE 1 TABLET (150 MG TOTAL) BY MOUTH 2 (TWO) TIMES DAILY.    SERTRALINE (ZOLOFT) 100 MG TABLET sertraline (ZOLOFT) 100 MG tablet       Take 1 tablet (100 mg total) by mouth once  daily. Takes 1.5 tablets daily.    Take 1 tablet (100 mg total) by mouth once daily. Takes 1.5 tablets daily.    SIMVASTATIN (ZOCOR) 20 MG TABLET simvastatin (ZOCOR) 20 MG tablet       TAKE 1 TABLET (20 MG TOTAL) BY MOUTH EVERY EVENING.    TAKE 1 TABLET (20 MG TOTAL) BY MOUTH EVERY EVENING.    TEMAZEPAM (RESTORIL) 15 MG CAP temazepam (RESTORIL) 15 mg Cap       TAKE 1 TO 2 CAPSULES BY MOUTH AT BEDTIME AS NEEDED INSOMNIA    TAKE 1 TO 2 CAPSULES BY MOUTH AT BEDTIME AS NEEDED INSOMNIA   Discontinued Medications    CMPD TESTOSTERONE PROPRIONATE 2% IN VANICREAM    Apply 4 pea size to inner thighs daily     Wellness reviewed  Will need SHAEHED in 6 mo (last PSA 7/2017)

## 2018-02-08 ENCOUNTER — TELEPHONE (OUTPATIENT)
Dept: FAMILY MEDICINE | Facility: CLINIC | Age: 53
End: 2018-02-08

## 2018-02-08 NOTE — TELEPHONE ENCOUNTER
PA for Testosterone denied . Left message on pt cell and wife cell that insurance will not cover. Pt inquired about paying cash for Rx. Left message that if pt decides to pay cash and get rx , he needs to call us back to get nurse appt sched for teaching . --lp

## 2018-02-09 ENCOUNTER — TELEPHONE (OUTPATIENT)
Dept: FAMILY MEDICINE | Facility: CLINIC | Age: 53
End: 2018-02-09

## 2018-02-09 DIAGNOSIS — F32.A DEPRESSION, UNSPECIFIED DEPRESSION TYPE: ICD-10-CM

## 2018-02-09 NOTE — TELEPHONE ENCOUNTER
PA required for testosterone.  This was completed and denied as pt has to have 2 low testosterone levels.

## 2018-02-21 ENCOUNTER — OFFICE VISIT (OUTPATIENT)
Dept: URGENT CARE | Facility: CLINIC | Age: 53
End: 2018-02-21
Payer: OTHER GOVERNMENT

## 2018-02-21 VITALS
SYSTOLIC BLOOD PRESSURE: 127 MMHG | WEIGHT: 212 LBS | BODY MASS INDEX: 32.23 KG/M2 | OXYGEN SATURATION: 96 % | HEART RATE: 77 BPM | DIASTOLIC BLOOD PRESSURE: 84 MMHG | TEMPERATURE: 98 F

## 2018-02-21 DIAGNOSIS — T14.8XXA BRUISING: ICD-10-CM

## 2018-02-21 DIAGNOSIS — S40.022A CONTUSION OF LEFT UPPER EXTREMITY, INITIAL ENCOUNTER: Primary | ICD-10-CM

## 2018-02-21 PROCEDURE — 3008F BODY MASS INDEX DOCD: CPT | Mod: S$GLB,,, | Performed by: FAMILY MEDICINE

## 2018-02-21 PROCEDURE — 99213 OFFICE O/P EST LOW 20 MIN: CPT | Mod: S$GLB,,, | Performed by: FAMILY MEDICINE

## 2018-02-27 RX ORDER — SERTRALINE HYDROCHLORIDE 100 MG/1
150 TABLET, FILM COATED ORAL DAILY
Qty: 135 TABLET | Refills: 1 | Status: SHIPPED | OUTPATIENT
Start: 2018-02-27

## 2018-07-09 ENCOUNTER — PATIENT MESSAGE (OUTPATIENT)
Dept: FAMILY MEDICINE | Facility: CLINIC | Age: 53
End: 2018-07-09

## 2018-07-24 DIAGNOSIS — F32.A DEPRESSION, UNSPECIFIED DEPRESSION TYPE: ICD-10-CM

## 2018-07-25 RX ORDER — BUPROPION HYDROCHLORIDE 150 MG/1
TABLET, EXTENDED RELEASE ORAL
Qty: 90 TABLET | Refills: 1 | Status: SHIPPED | OUTPATIENT
Start: 2018-07-25

## 2018-09-11 DIAGNOSIS — G47.00 INSOMNIA, UNSPECIFIED TYPE: ICD-10-CM

## 2018-09-11 RX ORDER — TEMAZEPAM 15 MG/1
CAPSULE ORAL
Qty: 100 CAPSULE | OUTPATIENT
Start: 2018-09-11

## 2019-04-21 NOTE — PATIENT INSTRUCTIONS
Use Melatonin to help you sleep.  Start with 5 mg 1 hr before bed and 5 mg as you turn off the light.  You may increase the 1 hr before dose to 10 mg, 20 mg, or 50 mg.  Keep the 5 mg as you turn off the light.     Statement Selected

## 2020-10-05 ENCOUNTER — PATIENT MESSAGE (OUTPATIENT)
Dept: ADMINISTRATIVE | Facility: HOSPITAL | Age: 55
End: 2020-10-05

## 2021-01-04 ENCOUNTER — PATIENT MESSAGE (OUTPATIENT)
Dept: ADMINISTRATIVE | Facility: HOSPITAL | Age: 56
End: 2021-01-04

## 2021-05-10 ENCOUNTER — PATIENT MESSAGE (OUTPATIENT)
Dept: RESEARCH | Facility: HOSPITAL | Age: 56
End: 2021-05-10